# Patient Record
Sex: FEMALE | Race: WHITE | NOT HISPANIC OR LATINO
[De-identification: names, ages, dates, MRNs, and addresses within clinical notes are randomized per-mention and may not be internally consistent; named-entity substitution may affect disease eponyms.]

---

## 2017-07-29 ENCOUNTER — HEALTH MAINTENANCE LETTER (OUTPATIENT)
Age: 32
End: 2017-07-29

## 2019-04-01 ENCOUNTER — MEDICAL CORRESPONDENCE (OUTPATIENT)
Dept: HEALTH INFORMATION MANAGEMENT | Facility: CLINIC | Age: 34
End: 2019-04-01

## 2019-04-26 ENCOUNTER — TRANSFERRED RECORDS (OUTPATIENT)
Dept: HEALTH INFORMATION MANAGEMENT | Facility: CLINIC | Age: 34
End: 2019-04-26

## 2019-06-02 ASSESSMENT — ENCOUNTER SYMPTOMS
TREMORS: 1
TROUBLE SWALLOWING: 0
WEAKNESS: 1
HEADACHES: 1
TINGLING: 0
INCREASED ENERGY: 1
WEIGHT GAIN: 0
WEIGHT LOSS: 0
NUMBNESS: 0
SORE THROAT: 0
SEIZURES: 0
HOARSE VOICE: 0
DECREASED APPETITE: 0
SPEECH CHANGE: 0
SMELL DISTURBANCE: 0
SINUS CONGESTION: 0
POLYDIPSIA: 0
NECK MASS: 0
SINUS PAIN: 0
PARALYSIS: 0
NIGHT SWEATS: 0
FATIGUE: 1
DIZZINESS: 1
TASTE DISTURBANCE: 0
ALTERED TEMPERATURE REGULATION: 0
POLYPHAGIA: 0
CHILLS: 0
DISTURBANCES IN COORDINATION: 1
HALLUCINATIONS: 0
LOSS OF CONSCIOUSNESS: 1
MEMORY LOSS: 0
FEVER: 0

## 2019-06-03 ENCOUNTER — OFFICE VISIT (OUTPATIENT)
Dept: NEUROSURGERY | Facility: CLINIC | Age: 34
End: 2019-06-03
Payer: OTHER GOVERNMENT

## 2019-06-03 VITALS
DIASTOLIC BLOOD PRESSURE: 75 MMHG | HEART RATE: 94 BPM | RESPIRATION RATE: 16 BRPM | WEIGHT: 187 LBS | SYSTOLIC BLOOD PRESSURE: 139 MMHG | OXYGEN SATURATION: 96 % | BODY MASS INDEX: 37.7 KG/M2 | HEIGHT: 59 IN

## 2019-06-03 DIAGNOSIS — Q01.9 ENCEPHALOCELE (H): Primary | ICD-10-CM

## 2019-06-03 RX ORDER — LEVONORGESTREL AND ETHINYL ESTRADIOL 0.15-0.03
1 KIT ORAL DAILY
COMMUNITY
Start: 2018-07-31

## 2019-06-03 ASSESSMENT — MIFFLIN-ST. JEOR: SCORE: 1463.86

## 2019-06-03 ASSESSMENT — PAIN SCALES - GENERAL: PAINLEVEL: MODERATE PAIN (5)

## 2019-06-03 NOTE — PATIENT INSTRUCTIONS
Thank you for choosing [facility] for your care.      F/u with Dr. Jensen in 6 weeks with Head CT w/out contrast.     Please contact Indira Brandon RN at [phone] with questions/concerns.       Thank you for trusting us with your care.

## 2019-06-03 NOTE — LETTER
6/3/2019       RE: Annette Ramos  [address] [place] [place] [address]     Dear Colleague,    Thank you for referring your patient, Annette Ramos, to the [facility] NEUROSURGERY at [facility]. Please see a copy of my visit note below.    Service Date: 06/03/2019      Tata Hernandez NP   [facility]    [address] [place] [address]      ER:  Annette Ramos.      Dear Ms. Hernandez:      I had the pleasure to meet Annette Ramos today in my Neurosurgical Clinic for evaluation of a ventriculoperitoneal shunt.      Briefly, Annette is a 33-year-old woman that was born with an occipital encephalocele.  This was shunted within the first couple days of birth.  She were required a second occipital shunt within the first 6 months of life.  Within a year one of the shunt was revised, but they are not clear which one.  She did well until she was in her 20s when she had a failure of the left ventricular shunt.  At that time a CT scan of the head, which I am able to see, demonstrated hydrocephalus isolated on the left lateral ventricle.  At this point in time Terri Lan at [place] revised the left shunt.  She has done well since.      At that time her symptoms were severe headache and passing out.      In March of this year she started developing headaches again.  These were intermittent headaches and they were associated with lightheadedness at times.  Because the symptoms are somewhat similar to previous shunt malfunction she has been quite concerned about this.  She did have a head CT that was performed at [facility] along with a shunt series.  The head CT does not demonstrate any enlargement of the ventricles.  I would presume based on her last failure and head CT that with failure she does get hydrocephalus.  Again, the most recent CT does not demonstrate any hydrocephalus.  The shunt series shows that she has 2 occipital shunts and these are both intact.       At this point in time, I do not think that she is having a shunt failure.  Her lightheadedness has improved and her headaches have not gotten worse.  She describes the headaches as intermittent and 4/10 when they are the worst.      I do not have a source for her symptoms, although again I do not think that this is a shunt failure.  I have explained all this to Mellisa and her mother and would like her to come back and see me in 4-6 weeks with a repeat head CT.  I have also given them my card with cell number and email on it should she have any problems in the meantime.  She is okay with this plan.      Overall, I spent approximately 45 minutes with Mellisa, with the majority of this time spent in consultation and developing a treatment plan.      Thank you for your trust and opportunity to participate in Mellisa's care.  If you have any further questions or concerns, please feel free to contact me on my cell phone at [phone].         PERICO BROWN MD             D: 2019   T: 2019   MT: CHRISTIANO      Name:     MELLISA LU   MRN:      [id number]        Account:      [id number]   :      1985           Service Date: 2019      Document: [id number]

## 2019-06-03 NOTE — NURSING NOTE
Chief Complaint   Patient presents with     Shunt     [facility] NEW POSSIBLE  SHUNT REVISION       Jeremiah Khan, EMT

## 2019-06-04 NOTE — PROGRESS NOTES
Service Date: 06/03/2019      Tata Hernandez NP   [facility]    [address] [place] [address]      ER:  Annette Ramos.      Dear Ms. Hernandez:      I had the pleasure to meet Annette Ramos today in my Neurosurgical Clinic for evaluation of a ventriculoperitoneal shunt.      Briefly, Annette is a 33-year-old woman that was born with an occipital encephalocele.  This was shunted within the first couple days of birth.  She were required a second occipital shunt within the first 6 months of life.  Within a year one of the shunt was revised, but they are not clear which one.  She did well until she was in her 20s when she had a failure of the left ventricular shunt.  At that time a CT scan of the head, which I am able to see, demonstrated hydrocephalus isolated on the left lateral ventricle.  At this point in time Terri Lan at [place] revised the left shunt.  She has done well since.      At that time her symptoms were severe headache and passing out.      In March of this year she started developing headaches again.  These were intermittent headaches and they were associated with lightheadedness at times.  Because the symptoms are somewhat similar to previous shunt malfunction she has been quite concerned about this.  She did have a head CT that was performed at [facility] along with a shunt series.  The head CT does not demonstrate any enlargement of the ventricles.  I would presume based on her last failure and head CT that with failure she does get hydrocephalus.  Again, the most recent CT does not demonstrate any hydrocephalus.  The shunt series shows that she has 2 occipital shunts and these are both intact.      At this point in time, I do not think that she is having a shunt failure.  Her lightheadedness has improved and her headaches have not gotten worse.  She describes the headaches as intermittent and 4/10 when they are the worst.      I do not have a source for her symptoms, although  again I do not think that this is a shunt failure.  I have explained all this to Mellisa and her mother and would like her to come back and see me in 4-6 weeks with a repeat head CT.  I have also given them my card with cell number and email on it should she have any problems in the meantime.  She is okay with this plan.      Overall, I spent approximately 45 minutes with Mellisa, with the majority of this time spent in consultation and developing a treatment plan.      Thank you for your trust and opportunity to participate in Mellisa's care.  If you have any further questions or concerns, please feel free to contact me on my cell phone at [phone].         PERICO BROWN MD             D: 2019   T: 2019   MT: CHRISTIANO      Name:     MELLISA LU   MRN:      [id number]        Account:      [id number]   :      1985           Service Date: 2019      Document: [id number]

## 2019-07-16 ENCOUNTER — OFFICE VISIT (OUTPATIENT)
Dept: NEUROSURGERY | Facility: CLINIC | Age: 34
End: 2019-07-16
Payer: OTHER GOVERNMENT

## 2019-07-16 ENCOUNTER — ANCILLARY PROCEDURE (OUTPATIENT)
Dept: CT IMAGING | Facility: CLINIC | Age: 34
End: 2019-07-16
Attending: NEUROLOGICAL SURGERY
Payer: OTHER GOVERNMENT

## 2019-07-16 VITALS — SYSTOLIC BLOOD PRESSURE: 125 MMHG | HEART RATE: 86 BPM | OXYGEN SATURATION: 97 % | DIASTOLIC BLOOD PRESSURE: 84 MMHG

## 2019-07-16 DIAGNOSIS — Q01.9 ENCEPHALOCELE (H): Primary | ICD-10-CM

## 2019-07-16 DIAGNOSIS — Q01.9 ENCEPHALOCELE (H): ICD-10-CM

## 2019-07-16 ASSESSMENT — PAIN SCALES - GENERAL: PAINLEVEL: NO PAIN (0)

## 2019-07-16 NOTE — NURSING NOTE
Chief Complaint   Patient presents with     RECHECK     [facility] RETURN - POSSIBLE SHUNT REVISION       Justino Cervantes, EMT

## 2019-07-16 NOTE — PATIENT INSTRUCTIONS
Follow up as needed for new concerns/or worsening symptoms.    Call [facility] Coordinator  if needed .    Thank you for using [facility] for your healthcare needs.

## 2019-07-16 NOTE — PROGRESS NOTES
Service Date: 2019      Tata Hernandez NP   [facility]    [address] [place] [address]      RE:  Annette Ramos      Dear Ms. Hernandez:        I had the pleasure to see Annette today in my Neurosurgical Clinic for evaluation of her ventriculoperitoneal shunt.      Annette recently moved to [facility] and wanted to establish care for her ventriculoperitoneal shunt.  I was happy to follow her.  At that time, she was having some headaches, although these have resolved at this point in time.  At the previous visit I wanted to get a repeat head CT which was obtained today.  In comparison to head CT from 3 months ago, this head CT is almost identical without any changes in the ventricular size.      At this point in time, I do believe that she has any shunt malfunction and I will continue to follow her in the future.  She is going to follow up on a p.r.n. basis.      Overall, I spent approximately 15 minutes with Annette, with the majority of this time spent in consultation and developing a treatment plan.      Thank you for your trust and opportunity to participate in Annette's care.  If you have any further questions or concerns, please feel free to contact me on my cell phone at [phone].         PERICO BROWN MD             D: 2019   T: 2019   MT: CHRISTIANO      Name:     ANNETTE RAMOS   MRN:      [id number]        Account:      [id number]   :      1985           Service Date: 2019      Document: [id number]

## 2019-07-16 NOTE — LETTER
RE: Annette BECKETT Richard  [address]  [place] [address]     Service Date: 07/16/2019      Tata Hernandez NP   [facility]    [address] [place] [address]      RE:  Annette Ramos      Dear Ms. Hernandez:        I had the pleasure to see Annette today in my Neurosurgical Clinic for evaluation of her ventriculoperitoneal shunt.      Annette recently moved to [facility] and wanted to establish care for her ventriculoperitoneal shunt.  I was happy to follow her.  At that time, she was having some headaches, although these have resolved at this point in time.  At the previous visit I wanted to get a repeat head CT which was obtained today.  In comparison to head CT from 3 months ago, this head CT is almost identical without any changes in the ventricular size.      At this point in time, I do believe that she has any shunt malfunction and I will continue to follow her in the future.  She is going to follow up on a p.r.n. basis.      Overall, I spent approximately 15 minutes with Annette, with the majority of this time spent in consultation and developing a treatment plan.      Thank you for your trust and opportunity to participate in Annette's care.  If you have any further questions or concerns, please feel free to contact me on my cell phone at [phone].     Fernando Jensen MD

## 2019-11-05 ENCOUNTER — HEALTH MAINTENANCE LETTER (OUTPATIENT)
Age: 34
End: 2019-11-05

## 2020-11-22 ENCOUNTER — HEALTH MAINTENANCE LETTER (OUTPATIENT)
Age: 35
End: 2020-11-22

## 2021-09-19 ENCOUNTER — HEALTH MAINTENANCE LETTER (OUTPATIENT)
Age: 36
End: 2021-09-19

## 2022-01-09 ENCOUNTER — HEALTH MAINTENANCE LETTER (OUTPATIENT)
Age: 37
End: 2022-01-09

## 2022-11-20 ENCOUNTER — HEALTH MAINTENANCE LETTER (OUTPATIENT)
Age: 37
End: 2022-11-20

## 2023-04-15 ENCOUNTER — HEALTH MAINTENANCE LETTER (OUTPATIENT)
Age: 38
End: 2023-04-15

## 2023-07-18 ENCOUNTER — TRANSCRIBE ORDERS (OUTPATIENT)
Dept: OTHER | Age: 38
End: 2023-07-18

## 2023-07-18 ENCOUNTER — TRANSFERRED RECORDS (OUTPATIENT)
Dept: HEALTH INFORMATION MANAGEMENT | Facility: CLINIC | Age: 38
End: 2023-07-18
Payer: OTHER GOVERNMENT

## 2023-07-18 DIAGNOSIS — H47.099 OPTIC NERVE DISORDER: ICD-10-CM

## 2023-07-18 DIAGNOSIS — H53.009 AMBLYOPIA, UNSPECIFIED LATERALITY: Primary | ICD-10-CM

## 2023-07-20 NOTE — TELEPHONE ENCOUNTER
FUTURE VISIT INFORMATION      FUTURE VISIT INFORMATION:    Date: 9/6/23    Time: 9:30am    Location: [facility]  REFERRAL INFORMATION:    Referring provider:  Karen Sarmiento OD    Referring providers clinic:  [facility]    Reason for visit/diagnosis  Amblyopia, unspecified laterality , Optic nerve disorder , evaluation of possible optic nerve pallor OU (history of occipital encephalocele and brain shunt insertion/removal    RECORDS REQUESTED FROM:       Clinic name Comments Records Status Imaging Status   [facility] recs scanned into chart under 7/18/23 EPIC

## 2023-09-06 ENCOUNTER — PRE VISIT (OUTPATIENT)
Dept: OPHTHALMOLOGY | Facility: CLINIC | Age: 38
End: 2023-09-06

## 2023-09-07 NOTE — TELEPHONE ENCOUNTER
FUTURE VISIT INFORMATION      FUTURE VISIT INFORMATION:  Date: 10/4/23  Time: 8:00am  Location: [facility]  REFERRAL INFORMATION:  Referring provider:  Karen Sarmiento OD  Referring providers clinic:  [facility]  Reason for visit/diagnosis  Amblyopia, unspecified laterality , Optic nerve disorder , evaluation of possible optic nerve pallor OU (history of occipital encephalocele and brain shunt insertion/removal     RECORDS REQUESTED FROM:         Clinic name Comments Records Status Imaging Status   [facility] recs scanned into chart under 7/18/23 EPIC

## 2023-09-29 ENCOUNTER — TELEPHONE (OUTPATIENT)
Dept: OPHTHALMOLOGY | Facility: CLINIC | Age: 38
End: 2023-09-29
Payer: OTHER GOVERNMENT

## 2023-10-04 ENCOUNTER — PRE VISIT (OUTPATIENT)
Dept: OPHTHALMOLOGY | Facility: CLINIC | Age: 38
End: 2023-10-04

## 2023-10-04 ENCOUNTER — OFFICE VISIT (OUTPATIENT)
Dept: OPHTHALMOLOGY | Facility: CLINIC | Age: 38
End: 2023-10-04
Payer: OTHER GOVERNMENT

## 2023-10-04 DIAGNOSIS — H53.009 AMBLYOPIA, UNSPECIFIED LATERALITY: ICD-10-CM

## 2023-10-04 DIAGNOSIS — H47.093 DISORDER OF OPTIC NERVE OF BOTH EYES: Primary | ICD-10-CM

## 2023-10-04 DIAGNOSIS — H50.05 ESOTROPIA, ALTERNATING: ICD-10-CM

## 2023-10-04 DIAGNOSIS — H49.21 6TH NERVE PALSY, RIGHT: ICD-10-CM

## 2023-10-04 DIAGNOSIS — H50.15 EXOTROPIA, ALTERNATING: ICD-10-CM

## 2023-10-04 DIAGNOSIS — H53.40 VISUAL FIELD DEFECT: Primary | ICD-10-CM

## 2023-10-04 PROCEDURE — 92083 EXTENDED VISUAL FIELD XM: CPT | Mod: GC | Performed by: OPHTHALMOLOGY

## 2023-10-04 PROCEDURE — 92060 SENSORIMOTOR EXAMINATION: CPT | Mod: GC | Performed by: OPHTHALMOLOGY

## 2023-10-04 PROCEDURE — 92133 CPTRZD OPH DX IMG PST SGM ON: CPT | Mod: GC | Performed by: OPHTHALMOLOGY

## 2023-10-04 PROCEDURE — 99204 OFFICE O/P NEW MOD 45 MIN: CPT | Mod: GC | Performed by: OPHTHALMOLOGY

## 2023-10-04 ASSESSMENT — CONF VISUAL FIELD
OD_SUPERIOR_TEMPORAL_RESTRICTION: 0
METHOD: COUNTING FINGERS
OD_INFERIOR_NASAL_RESTRICTION: 0
OD_NORMAL: 1
OS_SUPERIOR_NASAL_RESTRICTION: 0
OD_INFERIOR_TEMPORAL_RESTRICTION: 0
OS_INFERIOR_TEMPORAL_RESTRICTION: 0
OD_SUPERIOR_NASAL_RESTRICTION: 0
OS_INFERIOR_NASAL_RESTRICTION: 0
OS_NORMAL: 1
OS_SUPERIOR_TEMPORAL_RESTRICTION: 0

## 2023-10-04 ASSESSMENT — SLIT LAMP EXAM - LIDS
COMMENTS: NORMAL
COMMENTS: NORMAL

## 2023-10-04 ASSESSMENT — VISUAL ACUITY
OS_SC: 20/30
OS_SC+: -2
METHOD: SNELLEN - LINEAR
OD_SC: 20/50

## 2023-10-04 ASSESSMENT — CUP TO DISC RATIO
OS_RATIO: 0.4
OD_RATIO: 0.4

## 2023-10-04 ASSESSMENT — TONOMETRY
IOP_METHOD: ICARE
OD_IOP_MMHG: 09
OS_IOP_MMHG: 09

## 2023-10-04 ASSESSMENT — EXTERNAL EXAM - LEFT EYE: OS_EXAM: NORMAL

## 2023-10-04 ASSESSMENT — EXTERNAL EXAM - RIGHT EYE: OD_EXAM: NORMAL

## 2023-10-04 NOTE — LETTER
10/4/2023         RE:  :  MRN: Annette Ramos  1985  [id number]     Dear Dr. Sarmiento,    Thank you for asking me to see your very pleasant patient, Annette Ramos, in neuro-ophthalmic consultation.  I would like to thank you for sending your records and I have summarized them in the history of present illness.   My assessment and plan are below.  For further details, please see my attached clinic note.      Annette Ramos is a 38 year old female with the following diagnoses:   1. 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 Sarmiento for possible optic nerve pallor each eye.     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 Her occipital encephalocele was shunted within the first couple days of birth. She were required a second occipital shunt within the first 6 months of life. Within a year one of the shunt was revised, but they are not clear which one. She did well until she was in her 20s when she had a failure of the left ventricular shunt. At that time a CT scan of the head, which I am able to see, demonstrated hydrocephalus isolated on the left lateral ventricle. At this point in time Terri Lan at [place] revised the left shunt. She has done well since. In March of this year she started developing headaches again. These were intermittent headaches and they were associated with lightheadedness at times. Because the symptoms are somewhat similar to previous shunt malfunction she has been quite concerned about this. She did have a head CT in  that was performed at [facility] along with a shunt series. The head CT does not demonstrate any enlargement of the ventricles. Dr. Jensen at that time did not feel like a shunt revision was necessary at  that time. Patient was seeing a neuro-ophthalmologist Dr. Jhonatan Suarez Patient previously but he retired. She recently saw Dr. Sarmiento 2023 and they referred patient here for further evaluation. Denies double vision, eye pain. Maybe might have some decrease in vision but isn't sure. Patient feels like she can see better out of her left eye which she thinks it's always been that way.  Patient has had two strabismus surgeries as a child with Dr. Suarez but per mom patient's eyes were never really straight. She also has history of lasik. Patient would be interested in surgery to keep eyes straight instead of drifting. She denies double vision.          Independent historians:  Patient's mom     Review of outside testin2019 CT Head WO:   Impression: Stable findings dating back to 2019.  1. Bilateral parietal approach ventriculostomy catheters with shunted  ventricular system. No hydrocephalus.  2. 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 There is tectal beaking. 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 There is  atrophy of the right cerebellar hemisphere and esther. Severe  atrophy/agenesis of the vermis.     10/5/2010 MRI Brain WO:       My interpretation performed today of outside testing:  I have independently reviewed CT Head WO performed 2019.         Review of outside clinical notes:    2023 -- Visit with Dr. Sarmiento    I had the pleasure to meet Annette Ramos today in my Neurosurgical Clinic for evaluation of a ventriculoperitoneal shunt.      Briefly, Annette is a 33-year-old woman that was born with an occipital encephalocele.  This was shunted within the first couple days of birth.  She were required a second occipital shunt within the first 6 months of life.  Within a year one of the shunt was revised, but they are not clear  which one.  She did well until she was in her 20s when she had a failure of the left ventricular shunt.  At that time a CT scan of the head, which I am able to see, demonstrated hydrocephalus isolated on the left lateral ventricle.  At this point in time Terri Lan at [place] revised the left shunt.  She has done well since.      At that time her symptoms were severe headache and passing out.      In March of this year she started developing headaches again.  These were intermittent headaches and they were associated with lightheadedness at times.  Because the symptoms are somewhat similar to previous shunt malfunction she has been quite concerned about this.  She did have a head CT that was performed at [facility] along with a shunt series.  The head CT does not demonstrate any enlargement of the ventricles.  I would presume based on her last failure and head CT that with failure she does get hydrocephalus.  Again, the most recent CT does not demonstrate any hydrocephalus.  The shunt series shows that she has 2 occipital shunts and these are both intact.      At this point in time, I do not think that she is having a shunt failure.  Her lightheadedness has improved and her headaches have not gotten worse.  She describes the headaches as intermittent and 4/10 when they are the worst.      I do not have a source for her symptoms, although again I do not think that this is a shunt failure.  I have explained all this to Annette and her mother and would like her to come back and see me in 4-6 weeks with a repeat head CT.  I have also given them my card with cell number and email on it should she have any problems in the meantime.  She is okay with this plan.      Overall, I spent approximately 45 minutes with Annette, with the majority of this time spent in consultation and developing a treatment plan.   6/3/2019-- Visit with Dr. Jensen     Past medical history:    Patient Active Problem List   Diagnosis    Delay in  development    Encephalocele (H)    Migraine    Menorrhagia    Dysmenorrhea         Medications:   levonorgestrel-ethinyl estradiol  Multivitamin Tabs      Family history / social history:  Patient's denies any family medical problems.     Patient  reports that she has never smoked. She has never used smokeless tobacco. She reports current alcohol use. She reports that she does not use drugs.       Exam:  Visual acuity 20/50 right eye 20/30 left eye.  Color vision 2.5/11 right eye and 7/11 left eye.  Pupils no rAPD  Intraocular pressure wnl  right eye and wnl left eye.  Anterior segment exam wnl for age.  Fundus exam with pigmentary changes in the parafoveal region each eye with severe pallor of optic nerves each eye.  Strabismus exam  restriction in lateral gazes OD only also with associated right exotropia and left hypertropia.     Tests ordered and interpreted today:  OCT RNFL:  diffuse atrophy poor image quality OD, mod diffuse atrophy OS   VF: non specific constriction with high FP OD, non specific constriction OS           Discussion of management / interpretation with another provider:   None    Assessment/Plan:   This patient has a history of occipital encephalocele followed by shunting and multiple revisions.  At one point she was told that she had papilledema.  She also recalls being told that she had optic atrophy by Dr. Quiñones.  On today's examination she has reduced visual acuity in both eyes.  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  She does not have any new symptoms or worsening of her vision recently.  Most likely her optic atrophy is related to her previous papilledema.  I would recommend follow-up in 2 months to reassess her optic nerve appearance and nerve fiber layer.    She has a history of congenital strabismus.  She is undergone 2 strabismus surgeries.  She denies double vision on today's visit.  She does have a slight exotropia on examination but  is not cosmetically obvious.  We discussed that revision surgery would not be indicated at this point.        Again, thank you for allowing me to participate in the care of your patient.      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

## 2023-10-04 NOTE — PROGRESS NOTES
Annette Ramos is a 38 year old female with the following diagnoses:   1. 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 Sarmiento for possible optic nerve pallor each eye.     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 Her occipital encephalocele was shunted within the first couple days of birth. She were required a second occipital shunt within the first 6 months of life. Within a year one of the shunt was revised, but they are not clear which one. She did well until she was in her 20s when she had a failure of the left ventricular shunt. At that time a CT scan of the head, which I am able to see, demonstrated hydrocephalus isolated on the left lateral ventricle. At this point in time Terri Lan at [place] revised the left shunt. She has done well since. In March of this year she started developing headaches again. These were intermittent headaches and they were associated with lightheadedness at times. Because the symptoms are somewhat similar to previous shunt malfunction she has been quite concerned about this. She did have a head CT in 2019 that was performed at [facility] along with a shunt series. The head CT does not demonstrate any enlargement of the ventricles. Dr. Jensen at that time did not feel like a shunt revision was necessary at that time. Patient was seeing a neuro-ophthalmologist Dr. Jhonatan Suarez Patient previously but he retired. She recently saw Dr. Sarmiento 7/2023 and they referred patient here for further evaluation. Denies double vision, eye pain. Maybe might have some decrease in vision but isn't sure. Patient feels like she can see better out of her left eye which she thinks it's always been that way.  Patient has had two strabismus  surgeries as a child with Dr. Suarez but per mom patient's eyes were never really straight. She also has history of lasik. Patient would be interested in surgery to keep eyes straight instead of drifting. She denies double vision.          Independent historians:  Patient's mom     Review of outside testin2019 CT Head WO:   Impression: Stable findings dating back to 2019.  1. Bilateral parietal approach ventriculostomy catheters with shunted  ventricular system. No hydrocephalus.  2. 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 There is tectal beaking. 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 There is  atrophy of the right cerebellar hemisphere and esther. Severe  atrophy/agenesis of the vermis.     10/5/2010 MRI Brain WO:       My interpretation performed today of outside testing:  I have independently reviewed CT Head WO performed 2019.         Review of outside clinical notes:    2023 -- Visit with Dr. Sarmiento    I had the pleasure to meet Annette Ramos today in my [facility]surgical [facility] for evaluation of a ventriculoperitoneal shunt.      Briefly, Annette is a 33-year-old woman that was born with an occipital encephalocele.  This was shunted within the first couple days of birth.  She were required a second occipital shunt within the first 6 months of life.  Within a year one of the shunt was revised, but they are not clear which one.  She did well until she was in her 20s when she had a failure of the left ventricular shunt.  At that time a CT scan of the head, which I am able to see, demonstrated hydrocephalus isolated on the left lateral ventricle.  At this point in time Terri duffy [place] revised the left shunt.  She has done well since.      At that time her symptoms were severe headache and passing out.      In  this  year she started developing headaches again.  These were intermittent headaches and they were associated with lightheadedness at times.  Because the symptoms are somewhat similar to previous shunt malfunction she has been quite concerned about this.  She did have a head CT that was performed at [facility] along with a shunt series.  The head CT does not demonstrate any enlargement of the ventricles.  I would presume based on her last failure and head CT that with failure she does get hydrocephalus.  Again, the most recent CT does not demonstrate any hydrocephalus.  The shunt series shows that she has 2 occipital shunts and these are both intact.      At this point in time, I do not think that she is having a shunt failure.  Her lightheadedness has improved and her headaches have not gotten worse.  She describes the headaches as intermittent and 4/10 when they are the worst.      I do not have a source for her symptoms, although again I do not think that this is a shunt failure.  I have explained all this to Annette and her mother and would like her to come back and see me in 4-6 weeks with a repeat head CT.  I have also given them my card with cell number and email on it should she have any problems in the meantime.  She is okay with this plan.      Overall, I spent approximately 45 minutes with Annette, with the majority of this time spent in consultation and developing a treatment plan.   6/3/2019-- Visit with Dr. Jensen     Past medical history:    Patient Active Problem List   Diagnosis    Delay in development    Encephalocele (H)    Migraine    Menorrhagia    Dysmenorrhea         Medications:   levonorgestrel-ethinyl estradiol  Multivitamin Tabs      Family history / social history:  Patient's denies any family medical problems.     Patient  reports that she has never smoked. She has never used smokeless tobacco. She reports current alcohol use. She reports that she does not use drugs.       Exam:  Visual acuity  20/50 right eye 20/30 left eye.  Color vision 2.5/11 right eye and 7/11 left eye.  Pupils no rAPD  Intraocular pressure wnl  right eye and wnl left eye.  Anterior segment exam wnl for age.  Fundus exam with pigmentary changes in the parafoveal region each eye with severe pallor of optic nerves each eye.  Strabismus exam  restriction in lateral gazes OD only also with associated right exotropia and left hypertropia.     Tests ordered and interpreted today:  OCT RNFL:  diffuse atrophy poor image quality OD, mod diffuse atrophy OS   VF: non specific constriction with high FP OD, non specific constriction OS           Discussion of management / interpretation with another provider:   None    Assessment/Plan:   This patient has a history of occipital encephalocele followed by shunting and multiple revisions.  At one point she was told that she had papilledema.  She also recalls being told that she had optic atrophy by Dr. Quiñones.  On today's examination she has reduced visual acuity in both eyes.  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  She does not have any new symptoms or worsening of her vision recently.  Most likely her optic atrophy is related to her previous papilledema.  I would recommend follow-up in 2 months to reassess her optic nerve appearance and nerve fiber layer.    She has a history of congenital strabismus.  She is undergone 2 strabismus surgeries.  She denies double vision on today's visit.  She does have a slight exotropia on examination but is not cosmetically obvious.  We discussed that revision surgery would not be indicated at this point.          Attending Physician Attestation:  Complete documentation of historical and exam elements from today's encounter can be found in the full encounter summary report (not reduplicated in this progress note).  I personally obtained the chief complaint(s) and history of present illness.  I confirmed and edited as  necessary the review of systems, past medical/surgical history, family history, social history, and examination findings as documented by others; and I examined the patient myself.  I personally reviewed the relevant tests, images, and reports as documented above.  I formulated and edited as necessary the assessment and plan and discussed the findings and management plan with the patient and family. I personally reviewed the ophthalmic test(s) associated with this encounter, agree with the interpretation(s) as documented by the resident/fellow, and have edited the corresponding report(s) as necessary.  - Renzo Rausch MD   Fellow, Neuro-Ophthalmology

## 2023-10-17 ENCOUNTER — TRANSCRIBE ORDERS (OUTPATIENT)
Dept: OTHER | Age: 38
End: 2023-10-17

## 2023-10-17 DIAGNOSIS — H47.099 DISORDER OF OPTIC NERVE: Primary | ICD-10-CM

## 2024-06-22 ENCOUNTER — HEALTH MAINTENANCE LETTER (OUTPATIENT)
Age: 39
End: 2024-06-22

## 2025-07-12 ENCOUNTER — HEALTH MAINTENANCE LETTER (OUTPATIENT)
Age: 40
End: 2025-07-12